# Patient Record
Sex: FEMALE | Race: WHITE | Employment: OTHER | URBAN - METROPOLITAN AREA
[De-identification: names, ages, dates, MRNs, and addresses within clinical notes are randomized per-mention and may not be internally consistent; named-entity substitution may affect disease eponyms.]

---

## 2017-04-25 PROBLEM — Z86.711 HISTORY OF PULMONARY EMBOLISM: Status: ACTIVE | Noted: 2017-04-25

## 2017-05-13 PROCEDURE — 87186 SC STD MICRODIL/AGAR DIL: CPT | Performed by: INTERNAL MEDICINE

## 2017-05-13 PROCEDURE — 87088 URINE BACTERIA CULTURE: CPT | Performed by: INTERNAL MEDICINE

## 2017-05-13 PROCEDURE — 87086 URINE CULTURE/COLONY COUNT: CPT | Performed by: INTERNAL MEDICINE

## 2017-05-31 PROCEDURE — 87186 SC STD MICRODIL/AGAR DIL: CPT | Performed by: INTERNAL MEDICINE

## 2017-05-31 PROCEDURE — 87086 URINE CULTURE/COLONY COUNT: CPT | Performed by: INTERNAL MEDICINE

## 2017-05-31 PROCEDURE — 87077 CULTURE AEROBIC IDENTIFY: CPT | Performed by: INTERNAL MEDICINE

## 2017-10-30 PROBLEM — K58.0 IRRITABLE BOWEL SYNDROME WITH DIARRHEA: Status: ACTIVE | Noted: 2017-10-30

## 2018-04-23 PROBLEM — J45.40 MODERATE PERSISTENT ASTHMA WITHOUT COMPLICATION: Status: RESOLVED | Noted: 2018-04-23 | Resolved: 2018-04-23

## 2018-04-23 PROBLEM — F32.5 MAJOR DEPRESSIVE DISORDER IN FULL REMISSION (HCC): Status: ACTIVE | Noted: 2018-04-23

## 2018-04-23 PROBLEM — J45.40 MODERATE PERSISTENT ASTHMA WITHOUT COMPLICATION: Status: ACTIVE | Noted: 2018-04-23

## 2018-06-12 PROBLEM — Z01.810 PREOP CARDIOVASCULAR EXAM: Status: ACTIVE | Noted: 2018-06-12

## 2018-06-25 PROBLEM — D68.51 HETEROZYGOUS FACTOR V LEIDEN MUTATION (HCC): Status: ACTIVE | Noted: 2018-06-25

## 2018-08-03 PROBLEM — I34.0 NON-RHEUMATIC MITRAL REGURGITATION: Status: ACTIVE | Noted: 2018-08-03

## 2018-10-23 PROBLEM — Z01.810 PREOP CARDIOVASCULAR EXAM: Status: RESOLVED | Noted: 2018-06-12 | Resolved: 2018-10-23

## 2020-07-16 PROBLEM — I77.819 ECTATIC AORTA (HCC): Status: ACTIVE | Noted: 2020-07-16

## 2021-10-25 PROBLEM — I50.9 ACC/AHA STAGE B CONGESTIVE HEART FAILURE (HCC): Status: ACTIVE | Noted: 2021-10-25

## 2021-10-27 ENCOUNTER — APPOINTMENT (OUTPATIENT)
Dept: GENERAL RADIOLOGY | Facility: HOSPITAL | Age: 81
End: 2021-10-27
Attending: INTERNAL MEDICINE
Payer: MEDICARE

## 2021-10-27 ENCOUNTER — HOSPITAL ENCOUNTER (OUTPATIENT)
Facility: HOSPITAL | Age: 81
Setting detail: OBSERVATION
Discharge: HOME | End: 2021-10-29
Attending: EMERGENCY MEDICINE | Admitting: INTERNAL MEDICINE
Payer: MEDICARE

## 2021-10-27 ENCOUNTER — ANESTHESIA (OUTPATIENT)
Dept: ENDOSCOPY | Facility: HOSPITAL | Age: 81
End: 2021-10-27
Payer: MEDICARE

## 2021-10-27 ENCOUNTER — ANESTHESIA EVENT (OUTPATIENT)
Dept: ENDOSCOPY | Facility: HOSPITAL | Age: 81
End: 2021-10-27
Payer: MEDICARE

## 2021-10-27 ENCOUNTER — ANESTHESIA EVENT (OUTPATIENT)
Dept: SURGERY | Facility: HOSPITAL | Age: 81
End: 2021-10-27
Payer: MEDICARE

## 2021-10-27 DIAGNOSIS — K80.50 CHOLEDOCHOLITHIASIS: Primary | ICD-10-CM

## 2021-10-27 DIAGNOSIS — K80.20 CHOLELITHIASIS: ICD-10-CM

## 2021-10-27 DIAGNOSIS — R79.89 AZOTEMIA: ICD-10-CM

## 2021-10-27 PROCEDURE — 83605 ASSAY OF LACTIC ACID: CPT | Performed by: EMERGENCY MEDICINE

## 2021-10-27 PROCEDURE — 0DJ08ZZ INSPECTION OF UPPER INTESTINAL TRACT, VIA NATURAL OR ARTIFICIAL OPENING ENDOSCOPIC: ICD-10-PCS | Performed by: INTERNAL MEDICINE

## 2021-10-27 PROCEDURE — 36415 COLL VENOUS BLD VENIPUNCTURE: CPT

## 2021-10-27 PROCEDURE — 85025 COMPLETE CBC W/AUTO DIFF WBC: CPT | Performed by: EMERGENCY MEDICINE

## 2021-10-27 PROCEDURE — 83690 ASSAY OF LIPASE: CPT | Performed by: EMERGENCY MEDICINE

## 2021-10-27 PROCEDURE — 99285 EMERGENCY DEPT VISIT HI MDM: CPT

## 2021-10-27 PROCEDURE — 0FC98ZZ EXTIRPATION OF MATTER FROM COMMON BILE DUCT, VIA NATURAL OR ARTIFICIAL OPENING ENDOSCOPIC: ICD-10-PCS | Performed by: INTERNAL MEDICINE

## 2021-10-27 PROCEDURE — BD47ZZZ ULTRASONOGRAPHY OF GASTROINTESTINAL TRACT: ICD-10-PCS | Performed by: INTERNAL MEDICINE

## 2021-10-27 PROCEDURE — 80053 COMPREHEN METABOLIC PANEL: CPT | Performed by: EMERGENCY MEDICINE

## 2021-10-27 PROCEDURE — 96361 HYDRATE IV INFUSION ADD-ON: CPT

## 2021-10-27 PROCEDURE — 96375 TX/PRO/DX INJ NEW DRUG ADDON: CPT

## 2021-10-27 PROCEDURE — 87040 BLOOD CULTURE FOR BACTERIA: CPT | Performed by: EMERGENCY MEDICINE

## 2021-10-27 PROCEDURE — 96365 THER/PROPH/DIAG IV INF INIT: CPT

## 2021-10-27 PROCEDURE — 74328 X-RAY BILE DUCT ENDOSCOPY: CPT | Performed by: INTERNAL MEDICINE

## 2021-10-27 RX ORDER — SODIUM CHLORIDE, SODIUM LACTATE, POTASSIUM CHLORIDE, CALCIUM CHLORIDE 600; 310; 30; 20 MG/100ML; MG/100ML; MG/100ML; MG/100ML
INJECTION, SOLUTION INTRAVENOUS CONTINUOUS
Status: DISCONTINUED | OUTPATIENT
Start: 2021-10-27 | End: 2021-10-27 | Stop reason: HOSPADM

## 2021-10-27 RX ORDER — SODIUM CHLORIDE, SODIUM LACTATE, POTASSIUM CHLORIDE, CALCIUM CHLORIDE 600; 310; 30; 20 MG/100ML; MG/100ML; MG/100ML; MG/100ML
INJECTION, SOLUTION INTRAVENOUS CONTINUOUS PRN
Status: DISCONTINUED | OUTPATIENT
Start: 2021-10-27 | End: 2021-10-27 | Stop reason: SURG

## 2021-10-27 RX ORDER — CHOLECALCIFEROL (VITAMIN D3) 125 MCG
500 CAPSULE ORAL DAILY
Status: DISCONTINUED | OUTPATIENT
Start: 2021-10-28 | End: 2021-10-29

## 2021-10-27 RX ORDER — METOCLOPRAMIDE HYDROCHLORIDE 5 MG/ML
10 INJECTION INTRAMUSCULAR; INTRAVENOUS EVERY 8 HOURS PRN
Status: DISCONTINUED | OUTPATIENT
Start: 2021-10-27 | End: 2021-10-29

## 2021-10-27 RX ORDER — ONDANSETRON 2 MG/ML
4 INJECTION INTRAMUSCULAR; INTRAVENOUS ONCE
Status: COMPLETED | OUTPATIENT
Start: 2021-10-27 | End: 2021-10-27

## 2021-10-27 RX ORDER — MORPHINE SULFATE 4 MG/ML
4 INJECTION, SOLUTION INTRAMUSCULAR; INTRAVENOUS EVERY 2 HOUR PRN
Status: DISCONTINUED | OUTPATIENT
Start: 2021-10-27 | End: 2021-10-28

## 2021-10-27 RX ORDER — ONDANSETRON 2 MG/ML
4 INJECTION INTRAMUSCULAR; INTRAVENOUS EVERY 6 HOURS PRN
Status: DISCONTINUED | OUTPATIENT
Start: 2021-10-27 | End: 2021-10-28

## 2021-10-27 RX ORDER — CHOLECALCIFEROL (VITAMIN D3) 125 MCG
2000 CAPSULE ORAL DAILY
Status: DISCONTINUED | OUTPATIENT
Start: 2021-10-28 | End: 2021-10-29

## 2021-10-27 RX ORDER — MORPHINE SULFATE 4 MG/ML
2 INJECTION, SOLUTION INTRAMUSCULAR; INTRAVENOUS EVERY 2 HOUR PRN
Status: DISCONTINUED | OUTPATIENT
Start: 2021-10-27 | End: 2021-10-28

## 2021-10-27 RX ORDER — HYDROMORPHONE HYDROCHLORIDE 1 MG/ML
0.4 INJECTION, SOLUTION INTRAMUSCULAR; INTRAVENOUS; SUBCUTANEOUS EVERY 5 MIN PRN
Status: DISCONTINUED | OUTPATIENT
Start: 2021-10-27 | End: 2021-10-27 | Stop reason: HOSPADM

## 2021-10-27 RX ORDER — DILTIAZEM HYDROCHLORIDE 120 MG/1
240 CAPSULE, EXTENDED RELEASE ORAL 2 TIMES DAILY
Status: DISCONTINUED | OUTPATIENT
Start: 2021-10-27 | End: 2021-10-29

## 2021-10-27 RX ORDER — IPRATROPIUM BROMIDE AND ALBUTEROL SULFATE 2.5; .5 MG/3ML; MG/3ML
3 SOLUTION RESPIRATORY (INHALATION) EVERY 6 HOURS PRN
Status: DISCONTINUED | OUTPATIENT
Start: 2021-10-27 | End: 2021-10-28

## 2021-10-27 RX ORDER — ONDANSETRON 2 MG/ML
4 INJECTION INTRAMUSCULAR; INTRAVENOUS AS NEEDED
Status: DISCONTINUED | OUTPATIENT
Start: 2021-10-27 | End: 2021-10-27 | Stop reason: HOSPADM

## 2021-10-27 RX ORDER — HEPARIN SODIUM 5000 [USP'U]/ML
5000 INJECTION, SOLUTION INTRAVENOUS; SUBCUTANEOUS EVERY 8 HOURS SCHEDULED
Status: DISCONTINUED | OUTPATIENT
Start: 2021-10-27 | End: 2021-10-28

## 2021-10-27 RX ORDER — SODIUM CHLORIDE 9 MG/ML
INJECTION, SOLUTION INTRAVENOUS CONTINUOUS
Status: CANCELLED | OUTPATIENT
Start: 2021-10-27 | End: 2021-10-27

## 2021-10-27 RX ORDER — FENOFIBRATE 134 MG/1
134 CAPSULE ORAL NIGHTLY
Status: DISCONTINUED | OUTPATIENT
Start: 2021-10-27 | End: 2021-10-29

## 2021-10-27 RX ORDER — MORPHINE SULFATE 4 MG/ML
1 INJECTION, SOLUTION INTRAMUSCULAR; INTRAVENOUS EVERY 2 HOUR PRN
Status: DISCONTINUED | OUTPATIENT
Start: 2021-10-27 | End: 2021-10-28

## 2021-10-27 RX ORDER — ONDANSETRON 2 MG/ML
INJECTION INTRAMUSCULAR; INTRAVENOUS AS NEEDED
Status: DISCONTINUED | OUTPATIENT
Start: 2021-10-27 | End: 2021-10-27 | Stop reason: SURG

## 2021-10-27 RX ORDER — MAGNESIUM OXIDE 400 MG (241.3 MG MAGNESIUM) TABLET
1 TABLET NIGHTLY
Status: DISCONTINUED | OUTPATIENT
Start: 2021-10-27 | End: 2021-10-29

## 2021-10-27 RX ORDER — NALOXONE HYDROCHLORIDE 0.4 MG/ML
80 INJECTION, SOLUTION INTRAMUSCULAR; INTRAVENOUS; SUBCUTANEOUS AS NEEDED
Status: DISCONTINUED | OUTPATIENT
Start: 2021-10-27 | End: 2021-10-27 | Stop reason: HOSPADM

## 2021-10-27 RX ORDER — PANTOPRAZOLE SODIUM 20 MG/1
20 TABLET, DELAYED RELEASE ORAL
Refills: 1 | Status: DISCONTINUED | OUTPATIENT
Start: 2021-10-28 | End: 2021-10-27

## 2021-10-27 RX ORDER — PANTOPRAZOLE SODIUM 20 MG/1
20 TABLET, DELAYED RELEASE ORAL NIGHTLY
Status: DISCONTINUED | OUTPATIENT
Start: 2021-10-27 | End: 2021-10-29

## 2021-10-27 RX ORDER — HYDROMORPHONE HYDROCHLORIDE 1 MG/ML
0.5 INJECTION, SOLUTION INTRAMUSCULAR; INTRAVENOUS; SUBCUTANEOUS EVERY 30 MIN PRN
Status: DISCONTINUED | OUTPATIENT
Start: 2021-10-27 | End: 2021-10-27

## 2021-10-27 RX ORDER — SUCRALFATE 1 G/1
1 TABLET ORAL
Status: DISCONTINUED | OUTPATIENT
Start: 2021-10-27 | End: 2021-10-29

## 2021-10-27 RX ORDER — HYDROMORPHONE HYDROCHLORIDE 1 MG/ML
0.5 INJECTION, SOLUTION INTRAMUSCULAR; INTRAVENOUS; SUBCUTANEOUS EVERY 30 MIN PRN
Status: DISCONTINUED | OUTPATIENT
Start: 2021-10-27 | End: 2021-10-28

## 2021-10-27 RX ORDER — TRAZODONE HYDROCHLORIDE 50 MG/1
50 TABLET ORAL NIGHTLY
Status: DISCONTINUED | OUTPATIENT
Start: 2021-10-27 | End: 2021-10-29

## 2021-10-27 RX ORDER — SODIUM CHLORIDE 9 MG/ML
INJECTION, SOLUTION INTRAVENOUS CONTINUOUS
Status: DISCONTINUED | OUTPATIENT
Start: 2021-10-27 | End: 2021-10-29

## 2021-10-27 RX ORDER — DEXAMETHASONE SODIUM PHOSPHATE 4 MG/ML
VIAL (ML) INJECTION AS NEEDED
Status: DISCONTINUED | OUTPATIENT
Start: 2021-10-27 | End: 2021-10-27 | Stop reason: SURG

## 2021-10-27 RX ORDER — LABETALOL HYDROCHLORIDE 5 MG/ML
5 INJECTION, SOLUTION INTRAVENOUS EVERY 5 MIN PRN
Status: DISCONTINUED | OUTPATIENT
Start: 2021-10-27 | End: 2021-10-27 | Stop reason: HOSPADM

## 2021-10-27 RX ORDER — HYDROCODONE BITARTRATE AND ACETAMINOPHEN 5; 325 MG/1; MG/1
1 TABLET ORAL EVERY 4 HOURS PRN
Status: DISCONTINUED | OUTPATIENT
Start: 2021-10-27 | End: 2021-10-28

## 2021-10-27 RX ORDER — HYDRALAZINE HYDROCHLORIDE 20 MG/ML
10 INJECTION INTRAMUSCULAR; INTRAVENOUS EVERY 6 HOURS PRN
Status: DISCONTINUED | OUTPATIENT
Start: 2021-10-27 | End: 2021-10-29

## 2021-10-27 RX ORDER — LIDOCAINE HYDROCHLORIDE 10 MG/ML
INJECTION, SOLUTION EPIDURAL; INFILTRATION; INTRACAUDAL; PERINEURAL AS NEEDED
Status: DISCONTINUED | OUTPATIENT
Start: 2021-10-27 | End: 2021-10-27 | Stop reason: SURG

## 2021-10-27 RX ORDER — HYDROMORPHONE HYDROCHLORIDE 1 MG/ML
0.5 INJECTION, SOLUTION INTRAMUSCULAR; INTRAVENOUS; SUBCUTANEOUS EVERY 30 MIN PRN
Status: CANCELLED | OUTPATIENT
Start: 2021-10-27 | End: 2021-10-27

## 2021-10-27 RX ORDER — HYDROMORPHONE HYDROCHLORIDE 1 MG/ML
INJECTION, SOLUTION INTRAMUSCULAR; INTRAVENOUS; SUBCUTANEOUS
Status: COMPLETED
Start: 2021-10-27 | End: 2021-10-27

## 2021-10-27 RX ORDER — ROCURONIUM BROMIDE 10 MG/ML
INJECTION, SOLUTION INTRAVENOUS AS NEEDED
Status: DISCONTINUED | OUTPATIENT
Start: 2021-10-27 | End: 2021-10-27 | Stop reason: SURG

## 2021-10-27 RX ORDER — ACETAMINOPHEN 325 MG/1
650 TABLET ORAL EVERY 4 HOURS PRN
Status: DISCONTINUED | OUTPATIENT
Start: 2021-10-27 | End: 2021-10-29

## 2021-10-27 RX ORDER — ONDANSETRON 2 MG/ML
4 INJECTION INTRAMUSCULAR; INTRAVENOUS EVERY 4 HOURS PRN
Status: CANCELLED | OUTPATIENT
Start: 2021-10-27 | End: 2021-10-27

## 2021-10-27 RX ORDER — DULOXETIN HYDROCHLORIDE 30 MG/1
30 CAPSULE, DELAYED RELEASE ORAL DAILY
Status: DISCONTINUED | OUTPATIENT
Start: 2021-10-28 | End: 2021-10-29

## 2021-10-27 RX ORDER — FENOFIBRATE 134 MG/1
134 CAPSULE ORAL DAILY
Status: DISCONTINUED | OUTPATIENT
Start: 2021-10-28 | End: 2021-10-27

## 2021-10-27 RX ORDER — LISINOPRIL 10 MG/1
10 TABLET ORAL DAILY
Status: DISCONTINUED | OUTPATIENT
Start: 2021-10-28 | End: 2021-10-29

## 2021-10-27 RX ORDER — HYDROCODONE BITARTRATE AND ACETAMINOPHEN 5; 325 MG/1; MG/1
2 TABLET ORAL EVERY 4 HOURS PRN
Status: DISCONTINUED | OUTPATIENT
Start: 2021-10-27 | End: 2021-10-28

## 2021-10-27 RX ADMIN — DEXAMETHASONE SODIUM PHOSPHATE 8 MG: 4 MG/ML VIAL (ML) INJECTION at 15:26:00

## 2021-10-27 RX ADMIN — ONDANSETRON 4 MG: 2 INJECTION INTRAMUSCULAR; INTRAVENOUS at 15:42:00

## 2021-10-27 RX ADMIN — SODIUM CHLORIDE, SODIUM LACTATE, POTASSIUM CHLORIDE, CALCIUM CHLORIDE: 600; 310; 30; 20 INJECTION, SOLUTION INTRAVENOUS at 15:13:00

## 2021-10-27 RX ADMIN — ROCURONIUM BROMIDE 10 MG: 10 INJECTION, SOLUTION INTRAVENOUS at 15:28:00

## 2021-10-27 RX ADMIN — LIDOCAINE HYDROCHLORIDE 30 MG: 10 INJECTION, SOLUTION EPIDURAL; INFILTRATION; INTRACAUDAL; PERINEURAL at 15:18:00

## 2021-10-27 NOTE — ED QUICK NOTES
Patient states abd pain since Sunday. Last BM Sunday. Fells bloated and was vomiting over weekend.  No longer vomiting

## 2021-10-27 NOTE — ED PROVIDER NOTES
Patient Seen in: [facility] Emergency Department      History   Patient presents with:  Abdomen/Flank Pain    Stated Complaint: abd pain    Subjective:   HPI    21-year-old female presents to the emergency department for evaluation and hospitalizati 09/16/2015    Diverticulosis–sigmoid, melanosis coli, tortuous colon   • CYSTOSCOPY,MANIPULATN  03/01/2008    Cystocopy    • EGD  05/03/2007    GERD with esophagitis   • EGD  08/10/2011    Aborted   • EGD  09/16/2015    GERD with esophagitis; hiatal and pa exam: Awake and moving all 4 extremities well.     ED Course     Labs Reviewed   COMP METABOLIC PANEL (14) - Abnormal; Notable for the following components:       Result Value    Glucose 100 (*)     Sodium 135 (*)     BUN 21 (*)     GFR, Non- Citlaly to choledocholithiasis. Analgesics and Unasyn provided. 2.  Azotemia.   Admission disposition: 10/27/2021 12:25 PM                                  Disposition and Plan     Clinical Impression:  Choledocholithiasis  (primary encounter diagnosis)  Azotemia

## 2021-10-27 NOTE — ANESTHESIA PROCEDURE NOTES
Airway  Date/Time: 10/27/2021 3:18 PM  Urgency: elective    Airway not difficult    General Information and Staff    Patient location during procedure: OR  Anesthesiologist: Sharlene Michaels MD  Performed: anesthesiologist     Indications and Patient Condi

## 2021-10-27 NOTE — ANESTHESIA PREPROCEDURE EVALUATION
PRE-OP EVALUATION    Patient Name: Barabara Boast    Admit Diagnosis: No admission diagnoses are documented for this encounter.     Pre-op Diagnosis: add on    ENDOSCOPIC ULTRASOUND (EUS)AND ENDOSCOPIC RETROGRADE CHOLANGIOPANCREATOGRAPHY    Anesthesia Pro Neuro/Psych                              Patient Active Problem List:     Hypertension, essential     Dyslipidemia     Chronic obstructive pulmonary disease ([address]     History of pulmonary embolism     Irritable bowel syndrome with diarrhea     Major depres RBC 5.01 10/25/2021    HGB 16.2 (H) 10/27/2021    HGB 15.6 10/25/2021    HCT 49.1 (H) 10/27/2021    HCT 47.0 10/25/2021    MCV 94.1 10/27/2021    MCV 93.8 10/25/2021    MCH 31.0 10/27/2021    MCH 31.1 10/25/2021    MCHC 33.0 10/27/2021    MCHC 33.2 10/25/2

## 2021-10-27 NOTE — PROGRESS NOTES
Talked to Dr Jacquie Caballero - plan for lap josie possible open tomorrow afternoon. NPO midnight. Hold morning heparin.

## 2021-10-27 NOTE — OPERATIVE REPORT
[address] [facility] REPORT   PATIENT NAME: Stoero Cullen  MRN: [id number]  DATE OF OPERATION: 10/27/2021  PREOPERATIVE DIAGNOSIS: Obstructive jaundice, epigastric abdominal pain, elevated liver enzymes, dilated common bile duct, cholelithiasis  POS pancreatitis, neoplasms, or cysts. Pancreatic duct was normal in caliber throughout. Pancreatic duct was seen to be normal in the neck and was seen going toward the head of pancreas. The pancreatic tail was seen next to the left kidney and spleen.   The contrast was seen within the cystic duct, gallbladder, pancreatic duct. 2 tablets of indomethacin were given per rectum to prevent pancreatitis. Scope was withdrawn from the patient and patient tolerated the procedure well. FINDINGS   1.  Choledocholit

## 2021-10-27 NOTE — PROGRESS NOTES
Patient admitted via cart from PACU, transferred to bed. Oriented to room. Safety precautions initiated. Call light in reach. Denies pain or nausea. Started on clear liquids. General surgery paged regarding new consult.  Blood pressure improved from PACU

## 2021-10-27 NOTE — ANESTHESIA POSTPROCEDURE EVALUATION
[address] [facility] Patient Status:  Emergency   Age/Gender 79year old female MRN [id number]   Location [address] [facility] Attending No att. providers found   [facility] Day # 0 PCP Sugey Bucio MD       Anesthesia Post

## 2021-10-28 ENCOUNTER — ANESTHESIA (OUTPATIENT)
Dept: SURGERY | Facility: HOSPITAL | Age: 81
End: 2021-10-28
Payer: MEDICARE

## 2021-10-28 PROCEDURE — 88304 TISSUE EXAM BY PATHOLOGIST: CPT | Performed by: SURGERY

## 2021-10-28 PROCEDURE — 85025 COMPLETE CBC W/AUTO DIFF WBC: CPT | Performed by: INTERNAL MEDICINE

## 2021-10-28 PROCEDURE — 80053 COMPREHEN METABOLIC PANEL: CPT | Performed by: INTERNAL MEDICINE

## 2021-10-28 PROCEDURE — 0FT44ZZ RESECTION OF GALLBLADDER, PERCUTANEOUS ENDOSCOPIC APPROACH: ICD-10-PCS | Performed by: SURGERY

## 2021-10-28 PROCEDURE — 94640 AIRWAY INHALATION TREATMENT: CPT

## 2021-10-28 RX ORDER — HYDROMORPHONE HYDROCHLORIDE 1 MG/ML
0.2 INJECTION, SOLUTION INTRAMUSCULAR; INTRAVENOUS; SUBCUTANEOUS EVERY 2 HOUR PRN
Status: DISCONTINUED | OUTPATIENT
Start: 2021-10-28 | End: 2021-10-29

## 2021-10-28 RX ORDER — ONDANSETRON 2 MG/ML
4 INJECTION INTRAMUSCULAR; INTRAVENOUS AS NEEDED
Status: DISCONTINUED | OUTPATIENT
Start: 2021-10-28 | End: 2021-10-28 | Stop reason: HOSPADM

## 2021-10-28 RX ORDER — HYDROCODONE BITARTRATE AND ACETAMINOPHEN 5; 325 MG/1; MG/1
1 TABLET ORAL EVERY 4 HOURS PRN
Status: DISCONTINUED | OUTPATIENT
Start: 2021-10-28 | End: 2021-10-29

## 2021-10-28 RX ORDER — BUPIVACAINE HYDROCHLORIDE 5 MG/ML
INJECTION, SOLUTION EPIDURAL; INTRACAUDAL AS NEEDED
Status: DISCONTINUED | OUTPATIENT
Start: 2021-10-28 | End: 2021-10-28 | Stop reason: HOSPADM

## 2021-10-28 RX ORDER — HEPARIN SODIUM 5000 [USP'U]/ML
5000 INJECTION, SOLUTION INTRAVENOUS; SUBCUTANEOUS EVERY 12 HOURS SCHEDULED
Status: DISCONTINUED | OUTPATIENT
Start: 2021-10-29 | End: 2021-10-29

## 2021-10-28 RX ORDER — DIPHENHYDRAMINE HYDROCHLORIDE 50 MG/ML
12.5 INJECTION INTRAMUSCULAR; INTRAVENOUS AS NEEDED
Status: DISCONTINUED | OUTPATIENT
Start: 2021-10-28 | End: 2021-10-28 | Stop reason: HOSPADM

## 2021-10-28 RX ORDER — HYDROCODONE BITARTRATE AND ACETAMINOPHEN 5; 325 MG/1; MG/1
2 TABLET ORAL EVERY 4 HOURS PRN
Status: DISCONTINUED | OUTPATIENT
Start: 2021-10-28 | End: 2021-10-29

## 2021-10-28 RX ORDER — ONDANSETRON 2 MG/ML
INJECTION INTRAMUSCULAR; INTRAVENOUS AS NEEDED
Status: DISCONTINUED | OUTPATIENT
Start: 2021-10-28 | End: 2021-10-28 | Stop reason: SURG

## 2021-10-28 RX ORDER — HYDROCODONE BITARTRATE AND ACETAMINOPHEN 5; 325 MG/1; MG/1
2 TABLET ORAL AS NEEDED
Status: DISCONTINUED | OUTPATIENT
Start: 2021-10-28 | End: 2021-10-28 | Stop reason: HOSPADM

## 2021-10-28 RX ORDER — DEXAMETHASONE SODIUM PHOSPHATE 4 MG/ML
VIAL (ML) INJECTION AS NEEDED
Status: DISCONTINUED | OUTPATIENT
Start: 2021-10-28 | End: 2021-10-28 | Stop reason: SURG

## 2021-10-28 RX ORDER — MIDAZOLAM HYDROCHLORIDE 1 MG/ML
1 INJECTION INTRAMUSCULAR; INTRAVENOUS EVERY 5 MIN PRN
Status: DISCONTINUED | OUTPATIENT
Start: 2021-10-28 | End: 2021-10-28 | Stop reason: HOSPADM

## 2021-10-28 RX ORDER — ROCURONIUM BROMIDE 10 MG/ML
INJECTION, SOLUTION INTRAVENOUS AS NEEDED
Status: DISCONTINUED | OUTPATIENT
Start: 2021-10-28 | End: 2021-10-28 | Stop reason: SURG

## 2021-10-28 RX ORDER — IPRATROPIUM BROMIDE AND ALBUTEROL SULFATE 2.5; .5 MG/3ML; MG/3ML
3 SOLUTION RESPIRATORY (INHALATION)
Status: DISCONTINUED | OUTPATIENT
Start: 2021-10-28 | End: 2021-10-29

## 2021-10-28 RX ORDER — HYDROCODONE BITARTRATE AND ACETAMINOPHEN 5; 325 MG/1; MG/1
1 TABLET ORAL AS NEEDED
Status: DISCONTINUED | OUTPATIENT
Start: 2021-10-28 | End: 2021-10-28 | Stop reason: HOSPADM

## 2021-10-28 RX ORDER — ONDANSETRON 2 MG/ML
4 INJECTION INTRAMUSCULAR; INTRAVENOUS EVERY 6 HOURS PRN
Status: DISCONTINUED | OUTPATIENT
Start: 2021-10-28 | End: 2021-10-29

## 2021-10-28 RX ORDER — LABETALOL HYDROCHLORIDE 5 MG/ML
INJECTION, SOLUTION INTRAVENOUS AS NEEDED
Status: DISCONTINUED | OUTPATIENT
Start: 2021-10-28 | End: 2021-10-28 | Stop reason: SURG

## 2021-10-28 RX ORDER — MEPERIDINE HYDROCHLORIDE 25 MG/ML
12.5 INJECTION INTRAMUSCULAR; INTRAVENOUS; SUBCUTANEOUS AS NEEDED
Status: DISCONTINUED | OUTPATIENT
Start: 2021-10-28 | End: 2021-10-28 | Stop reason: HOSPADM

## 2021-10-28 RX ORDER — HYDROMORPHONE HYDROCHLORIDE 1 MG/ML
0.4 INJECTION, SOLUTION INTRAMUSCULAR; INTRAVENOUS; SUBCUTANEOUS EVERY 5 MIN PRN
Status: DISCONTINUED | OUTPATIENT
Start: 2021-10-28 | End: 2021-10-28 | Stop reason: HOSPADM

## 2021-10-28 RX ORDER — NALOXONE HYDROCHLORIDE 0.4 MG/ML
80 INJECTION, SOLUTION INTRAMUSCULAR; INTRAVENOUS; SUBCUTANEOUS AS NEEDED
Status: DISCONTINUED | OUTPATIENT
Start: 2021-10-28 | End: 2021-10-28 | Stop reason: HOSPADM

## 2021-10-28 RX ORDER — HYDROMORPHONE HYDROCHLORIDE 1 MG/ML
0.4 INJECTION, SOLUTION INTRAMUSCULAR; INTRAVENOUS; SUBCUTANEOUS EVERY 2 HOUR PRN
Status: DISCONTINUED | OUTPATIENT
Start: 2021-10-28 | End: 2021-10-29

## 2021-10-28 RX ORDER — SODIUM CHLORIDE, SODIUM LACTATE, POTASSIUM CHLORIDE, CALCIUM CHLORIDE 600; 310; 30; 20 MG/100ML; MG/100ML; MG/100ML; MG/100ML
INJECTION, SOLUTION INTRAVENOUS CONTINUOUS
Status: DISCONTINUED | OUTPATIENT
Start: 2021-10-28 | End: 2021-10-28 | Stop reason: HOSPADM

## 2021-10-28 RX ORDER — POLYETHYLENE GLYCOL 3350 17 G/17G
17 POWDER, FOR SOLUTION ORAL DAILY PRN
Status: DISCONTINUED | OUTPATIENT
Start: 2021-10-28 | End: 2021-10-29

## 2021-10-28 RX ADMIN — DEXAMETHASONE SODIUM PHOSPHATE 4 MG: 4 MG/ML VIAL (ML) INJECTION at 14:31:00

## 2021-10-28 RX ADMIN — ONDANSETRON 4 MG: 2 INJECTION INTRAMUSCULAR; INTRAVENOUS at 14:58:00

## 2021-10-28 RX ADMIN — SODIUM CHLORIDE: 9 INJECTION, SOLUTION INTRAVENOUS at 15:19:00

## 2021-10-28 RX ADMIN — ROCURONIUM BROMIDE 50 MG: 10 INJECTION, SOLUTION INTRAVENOUS at 14:18:00

## 2021-10-28 RX ADMIN — LABETALOL HYDROCHLORIDE 5 MG: 5 INJECTION, SOLUTION INTRAVENOUS at 14:35:00

## 2021-10-28 NOTE — PROGRESS NOTES
[facility] Hospitalist Progress Note     Néstor Raza Patient Status:  Observation    1940 MRN [id number]   Location [facility] Attending Alhaji Bhatti,    Hosp Day # 0 PCP Connor Cancino Results    COVID-19  Lab Results   Component Value Date    COVID19 Not Detected 10/27/2021       Pro-Calcitonin  No results for input(s): PCT in the last 168 hours. Cardiac  No results for input(s): TROP, PBNP in the last 168 hours.     Creatinine Kinase ASA when cleared by GI and gen surg     # Factor V Leiden mutation   - hx of PE in 2011  - was on xarelto until Dec 2020  - follows with Dr. Yenny Grant of oncology   - monitored off anticoagulation     Plan of care:  Post op care per gen surgery recs.       Claudio

## 2021-10-28 NOTE — CONSULTS
[facility]    Report of Gastroenterology Consultation    Elizabeth Elizabeth Patient Status:  Observation    1940 MRN [id number]   [facility] 3SW-A Attending Ruy Faye, DO   Hosp Day # 0 PCP Austyn Clifford MD     Date of hemorrhoids   • COLONOSCOPY  08/10/2011    Tortuous colon, aborted at descending   • COLONOSCOPY  05/23/2012    Tubular adenoma, descending; diverticulosis–sigmoid   • COLONOSCOPY  09/16/2015    Diverticulosis–sigmoid, melanosis coli, tortuous colon   • CY Facility-Administered Medications:   •  HYDROmorphone HCl (DILAUDID) 1 MG/ML injection 0.5 mg, 0.5 mg, Intravenous, Q30 Min PRN  •  0.9% NaCl infusion, , Intravenous, Continuous  •  Heparin Sodium (Porcine) 5000 UNIT/ML injection 5,000 Units, 5,000 Units, Systems:  Gastrointestinal: Denies positive test for blood stool, heartburn/indigestion/reflux, belching, difficulty swallowing, irregular bowel habits, painful swallowing, diarrhea, abdominal pain, constipation, nausea, incontinence of stool, vomiting, bl excessive bleeding, enlarging or painful lymph nodes. Allergy: Denies medication allergy, latex/rubber allergy, anaphylactic or other reaction to anesthesia, food allergy. Eyes: Denies blurred/double vision, eye disease, glasses or contacts, glaucoma. regurgitation     Carpal tunnel syndrome     History of bladder infections     Osteoarthritis     Hx of colonic polyps     GERD (gastroesophageal reflux disease)     Diverticulosis of colon     Paraesophageal hernia     Ectatic aorta (HCC)     ACC/AHA stag

## 2021-10-28 NOTE — PLAN OF CARE
Patient resting comfortable in bed. Denies pain or nausea. Aware of plan for surgery later this afternoon. IV changed to left wrist from right AC per patient request.  Discussed need to CDB, use IS and do ankle pump exercises after surgery.  Patient able

## 2021-10-28 NOTE — ANESTHESIA PREPROCEDURE EVALUATION
PRE-OP EVALUATION    Patient Name: Yanet Rehman    Admit Diagnosis: Choledocholithiasis [K80.50]  Azotemia [R79.89]    Pre-op Diagnosis: Cholelithiasis [K80.20]    LAPAROSCOPIC CHOLECYSTECTOMY POSSIBLE OPEN    Anesthesia Procedure: Lidya Blackmon (INDOCIN) 50 MG suppository, , ,   Vitamin D3 (Cholecalciferol) tab 2,000 Units, 2,000 Units, Oral, Daily  dilTIAZem (DILACOR XR) 24 hr cap 240 mg, 240 mg, Oral, BID  DULoxetine (CYMBALTA) DR particles cap 30 mg, 30 mg, Oral, Daily  lisinopril tab 10 mg, 1 time  Estradiol (ESTRING) 2 MG Vaginal Ring, Place 1 mg vaginally every 21 days. , Disp: , Rfl: , Past Week at Unknown time  melatonin 1 MG Oral Tab, Take 1 mg by mouth nightly., Disp: , Rfl: , Past Week at Unknown time  Cholecalciferol (VITAMIN D) 50 MCG ( Irritable bowel syndrome with diarrhea     Major depressive disorder in full remission ([facility] [address]     Heterozygous factor V Leiden mutation ([facility] [address]     Non-rheumatic mitral regurgitation     Carpal tunnel syndrome     History of bladder infections     Osteoarthr 10/25/2021    MCHC 33.5 10/28/2021    MCHC 33.2 10/25/2021    RDW 13.3 10/28/2021    RDW 13.2 10/25/2021    .0 10/28/2021     10/25/2021     Lab Results   Component Value Date     10/28/2021     10/25/2021    K 3.4 (L) 10/28/2021

## 2021-10-28 NOTE — PLAN OF CARE
Pt is alert and oriented on room air. . Bilateral SCD. Voiding freely. Last bowel movement on 10/24. Pt denies pain. Denies n/v. NPO after midnight. Plan: Slava galindo Thursday afternoon.

## 2021-10-28 NOTE — ANESTHESIA PROCEDURE NOTES
Airway  Date/Time: 10/28/2021 2:20 PM  Urgency: elective      General Information and Staff    Patient location during procedure: OR  Anesthesiologist: Lexii Thurston MD  Performed: anesthesiologist     Indications and Patient Condition  Indications for air

## 2021-10-28 NOTE — CONSULTS
[facility]  Report of Consultation    Prerna Bae Patient Status:  Observation    1940 MRN [id number]   [facility] [place] 3SW-A Attending Kehinde Fraser,    Hosp Day # 0 PCP Katelynn Eddy MD     10/28/21    Reason for Consul COLONOSCOPY  05/03/2007    Diverticulosis-sigmoid; hemorrhoids    COLONOSCOPY  08/10/2011    Tortuous colon, aborted at descending    COLONOSCOPY  05/23/2012    Tubular adenoma, descending; diverticulosis-sigmoid    COLONOSCOPY  09/16/2015    [address] Allergy  Tizanidine              UNKNOWN    Current Medications:      Current Facility-Administered Medications:     HYDROmorphone HCl (DILAUDID) 1 MG/ML injection 0.5 mg, 0.5 mg, Intravenous, Q30 Min PRN    0.9% NaCl infusion, , Intravenous, Continuous BID    Home Medications:    No current facility-administered medications on file prior to encounter.   sucralfate 1 g Oral Tab, Take 1 tablet (1 g total) by mouth 4 (four) times daily before meals and nightly., Disp: 30 tablet, Rfl: 0  ondansetron (ZOFRAN) Exam:    Blood pressure 135/67, pulse 66, temperature 98 °F (36.7 °C), temperature source Oral, resp. rate 16, SpO2 95 %, not currently breastfeeding.     GENERAL: Alert and oriented x 3, well developed, well nourished female, in no apparent distress    SKI cholecystitis. Important findings were communicated to [facility] by Dr. Patito Mueller at 10/26/2021 3:58 PM.     XR ENDO BILE DUCT ([id number]=[id number])    Result Date: 10/27/2021  PROCEDURE:  XR ENDO BILE DUCT ([id number]=[id number])  COMPARISON:  None.   INDICATIONS:  ERCP Surgery  10/28/2021    Addendum  Agree as above   Will proceed with lap cholecystectomy possible open today  Likely will discharge home tomorrow   Will repeat hepatic panel during the postoperative visit (trending down but not normal)  Voiced understanding

## 2021-10-28 NOTE — ANESTHESIA POSTPROCEDURE EVALUATION
[facility] Patient Status:  Observation   Age/Gender 79year old female MRN [id number]   Location [address] [facility] Attending Asher Knox,    Hosp Day # 0 PCP Rene Garcia MD       Anesthesia Pos

## 2021-10-28 NOTE — BRIEF OP NOTE
Pre-Operative Diagnosis: Cholelithiasis [K80.20]     Post-Operative Diagnosis: Cholelithiasis [K80.20]      Procedure Performed:   LAPAROSCOPIC CHOLECYSTECTOMY     Surgeon(s) and Role:     Evelio Chaney MD - Primary    Assistant(s):  Surgical Assistant

## 2021-10-29 VITALS
TEMPERATURE: 98 F | HEART RATE: 85 BPM | BODY MASS INDEX: 26 KG/M2 | RESPIRATION RATE: 16 BRPM | OXYGEN SATURATION: 97 % | WEIGHT: 132.06 LBS | SYSTOLIC BLOOD PRESSURE: 109 MMHG | DIASTOLIC BLOOD PRESSURE: 64 MMHG

## 2021-10-29 PROCEDURE — 94640 AIRWAY INHALATION TREATMENT: CPT

## 2021-10-29 RX ORDER — AMOXICILLIN AND CLAVULANATE POTASSIUM 875; 125 MG/1; MG/1
1 TABLET, FILM COATED ORAL 2 TIMES DAILY
Qty: 10 TABLET | Refills: 0 | Status: SHIPPED | OUTPATIENT
Start: 2021-10-29 | End: 2021-11-03

## 2021-10-29 RX ORDER — TRAMADOL HYDROCHLORIDE 50 MG/1
TABLET ORAL EVERY 4 HOURS PRN
Qty: 20 TABLET | Refills: 0 | Status: SHIPPED | OUTPATIENT
Start: 2021-10-29

## 2021-10-29 NOTE — PLAN OF CARE
Pt is alert and oriented on room air. . I/S. Bilateral SCD. I/S encourage. Voiding freely. Last bowel movement 10/24. Pt denies n/v. Pt tolerating full liquid diet. Lap site X4 to abdomen is C,D,I. Pt reports mild pain to site.  Pt denies pain medication

## 2021-10-29 NOTE — PLAN OF CARE
Patient up to bathroom with assist. Voided. Tolerating full liquids.   Discussed advance to soft diet in am.

## 2021-10-29 NOTE — DISCHARGE SUMMARY
[facility] Internal Medicine Hospitalist Discharge Summary     Patient ID:  Mata Correa  79year old  5/4/1940    Admit date: 10/27/2021    Discharge date and time: 10/29/2021     Attending Natalee Procedures: Procedure(s) (LRB):  LAPAROSCOPIC CHOLECYSTECTOMY (N/A)       Patient instructions:      I as the attending physician reconciled the current and discharge medications on day of discharge.      Current Discharge Medication List    CONTINUE these distress, alert and oriented x 3  Heart: RRR  Lungs: clear bilaterally, no active wheezing  Abdomen: nontender, nondistended, intact BS. Lab incisions clean with some bruising.   Extremities: no pedal edema   Neuro: CN inact, no focal deficits      Total t

## 2021-10-29 NOTE — PROGRESS NOTES
[facility]  Progress Note    Adal Balling Patient Status:  Observation    1940 MRN [id number]   [facility] 3SW-A Attending Asher Knox, DO   Hosp Day # 0 PCP Rene Garcia MD     Subjective:    Patient tolerating PO d

## 2021-10-29 NOTE — PROGRESS NOTES
[facility]    Progress Note    Barrie Chicas Patient Status:  Observation    1940 MRN [id number]   [facility] 3SW-A Attending Ronda Delarosa,    Hosp Day # 0 PCP Octaviano Sandra MD     Subjective:  Barrie Chicas is a mitral regurgitation     Carpal tunnel syndrome     History of bladder infections     Osteoarthritis     Hx of colonic polyps     GERD (gastroesophageal reflux disease)     Diverticulosis of colon     Paraesophageal hernia     Ectatic aorta (HCC)     ACC/A no

## 2021-10-29 NOTE — PLAN OF CARE
A&O x4. VSS on 2L O2 per nc. Neb tx scheduled BID. Pain controlled, declines pain medication overnight. Ambulating with SBA. Voids freely. Bilateral SCDs. Lap sites x4 open to air, C/D/I. Bruising to abdomen surrounding lap sites. Bowel sounds active.  Pt p

## 2021-10-29 NOTE — PLAN OF CARE
Received patient from PACU. Rating pain a \"5\". Patient does not want pain medication at this time. Discussed that Norco available if needed. Patient states laying on left side helps decrease pain.   Discussed need to CDB, use IS and do ankle pump exerci

## 2021-11-02 ENCOUNTER — PATIENT OUTREACH (OUTPATIENT)
Dept: CASE MANAGEMENT | Age: 81
End: 2021-11-02

## 2021-11-02 NOTE — PROGRESS NOTES
Patients daughter called requesting assistance             MD Guillermo [address], [address]  [phone]     Apt scheduled for Nov 15th @1:30

## 2021-11-23 NOTE — OPERATIVE REPORT
[address] [place]    PATIENT'S NAME: [place] Rose   ATTENDING PHYSICIAN: Bill Dominguez DO   OPERATING PHYSICIAN: Hebert Bingham M.D.    PATIENT ACCOUNT#:   [de-identified]    LOCATION:  [address] [place] [address]  MEDICAL RECORD #:   [id number]       DATE OF BIRTH:  [birth year].  Abdomen was elevated. Veress needle was inserted. The abdomen was insufflated with CO2. A 5 mm Visiport trocar was inserted under direct guidance of the camera.   Then, an 11 mm port in the subxiphoid and two 5 mm ports in the right upper quadrant

## 2024-12-19 NOTE — H&P
[facility] Hospitalist History and Physical      Patient presents with:  Abdomen/Flank Pain       PCP: Lucy Gonsalez MD      History of Present Illness: Patient is a 79year old female with PMH sig for HTN, COPD, depression, aortic ectasia, an See Note for instructions given to patient.     05/23/2012    Tubular adenoma, descending; diverticulosis–sigmoid   • COLONOSCOPY  09/16/2015    Diverticulosis–sigmoid, melanosis coli, tortuous colon   • CYSTOSCOPY,MANIPULATN  03/01/2008    Cystocopy    • EGD  05/03/2007    GERD with esophagitis   • EGD mouth daily. , Disp: 90 capsule, Rfl: 1  omeprazole 20 MG Oral Capsule Delayed Release, Take 1 capsule (20 mg total) by mouth daily. , Disp: 90 capsule, Rfl: 1  ASPIRIN 81 MG Oral Tab EC, TAKE 1 TABLET BY MOUTH EVERY DAY, Disp: 90 tablet, Rfl: 1  Fluticasone alert and oriented x3, no focal neurologic deficits  HEENT:  EOMI, PERRLA, OP clear, MMM  Pulm: Lungs clear bilaterally, normal respiratory effort  CV: Heart with regular rate and rhythm, no murmur. Normal PMI.     Abd: Abdomen soft, +upper abd tenderness, XR ENDO BILE DUCT (ZGC=[id number])    Result Date: 10/27/2021  PROCEDURE:  XR ENDO BILE DUCT (LRM=[id number])  COMPARISON:  None.   INDICATIONS:  ERCP  FLUOROSCOPY IMAGES OBTAINED:  3 FLUOROSCOPY TIME:  1minute 15 seconds TECHNOLOGIST TIME:  30minutes RADIATION D Hospitalist      **Certification      PHYSICIAN Certification of Need for Inpatient Hospitalization - Initial Certification    Patient will require inpatient services that will reasonably be expected to span two midnight's based on the clinical documentati 5

## (undated) DEVICE — ENDOPATH ULTRA VERESS INSUFFLATION NEEDLES WITH LUER LOCK CONNECTORS: Brand: ENDOPATH

## (undated) DEVICE — SCD SLEEVE KNEE HI BLEND

## (undated) DEVICE — FIRSTSTEP 200ML READY2USE GEMI

## (undated) DEVICE — RETRIEVAL BALLOON CATHETER: Brand: EXTRACTOR™ PRO RX

## (undated) DEVICE — KENDALL SCD EXPRESS SLEEVES, KNEE LENGTH, MEDIUM: Brand: KENDALL SCD

## (undated) DEVICE — STERILE SYNTHETIC POLYISOPRENE POWDER-FREE SURGICAL GLOVES WITH HYDROGEL COATING: Brand: PROTEXIS

## (undated) DEVICE — ANCHOR TISSUE RETRIEVAL SYSTEM, BAG SIZE 175 ML, PORT SIZE 10 MM: Brand: ANCHOR TISSUE RETRIEVAL SYSTEM

## (undated) DEVICE — SYRINGE 10ML SLIP TIP

## (undated) DEVICE — LAP CHOLE/APPY CDS-LF: Brand: MEDLINE INDUSTRIES, INC.

## (undated) DEVICE — SYRINGE IV FLUSH PRE-FILL10M

## (undated) DEVICE — LIGHT HANDLE

## (undated) DEVICE — LIGACLIP EXTRA LIGATING CLIP CARTRIDGES: 6 TITANIUM CLIPS/ CARTRIDGE (LARGE): Brand: LIGACLIP

## (undated) DEVICE — VISUALIZATION SYSTEM: Brand: CLEARIFY

## (undated) DEVICE — ALCOHOL PREP,2 PLY, MEDIUM: Brand: WEBCOL

## (undated) DEVICE — 1200CC GUARDIAN II: Brand: GUARDIAN

## (undated) DEVICE — SOL  .9 1000ML BTL

## (undated) DEVICE — 3M™ RED DOT™ MONITORING ELECTRODE WITH FOAM TAPE AND STICKY GEL, 50/BAG, 20/CASE, 72/PLT 2570: Brand: RED DOT™

## (undated) DEVICE — MEDI-VAC NON-CONDUCTIVE SUCTION TUBING: Brand: CARDINAL HEALTH

## (undated) DEVICE — TROCAR: Brand: KII FIOS FIRST ENTRY

## (undated) DEVICE — BITE BLOCK ADULT 60F ELASTIC

## (undated) DEVICE — DISPOSABLE LAPAROSCOPIC CLIP APPLIER WITH 20 CLIPS.: Brand: EPIX® UNIVERSAL CLIP APPLIER

## (undated) DEVICE — UNDYED BRAIDED (POLYGLACTIN 910), SYNTHETIC ABSORBABLE SUTURE: Brand: COATED VICRYL

## (undated) DEVICE — FILTERLINE NASAL ADULT O2/CO2

## (undated) DEVICE — LOCKING DEVICE RX & BIOPSY CAP

## (undated) DEVICE — SPHINCTEROTOME: Brand: HYDRATOME RX 44

## (undated) DEVICE — REM POLYHESIVE ADULT PATIENT RETURN ELECTRODE: Brand: VALLEYLAB

## (undated) DEVICE — SUTURE VICRYL 0

## (undated) DEVICE — ENDOSCOPY PACK - LOWER: Brand: MEDLINE INDUSTRIES, INC.

## (undated) DEVICE — MEDI-VAC SUCTION HANDLE REGULAR CAPACITY: Brand: CARDINAL HEALTH

## (undated) DEVICE — GLOVE SURG SENSICARE SZ 7

## (undated) DEVICE — TROCAR: Brand: KII® SLEEVE

## (undated) DEVICE — EXOFIN TISSUE ADHESIVE 1.0ML

## (undated) DEVICE — ENDOPATH 5MM CURVED SCISSORS WITH MONOPOLAR CAUTERY: Brand: ENDOPATH

## (undated) DEVICE — 40580 - THE PINK PAD - ADVANCED TRENDELENBURG POSITIONING KIT: Brand: 40580 - THE PINK PAD - ADVANCED TRENDELENBURG POSITIONING KIT

## (undated) NOTE — LETTER
[address] FOR BLOOD OR BLOOD COMPONENTS      In the course of your treatment, it may become necessary to administer a transfusion of blood or blood components.  This form provides basic information concerning this proc alternatives to you if it has not already been done. I,Avril Wright, have read/had read to me the above. I understand the matters bearing on the decision whether or not to authorize a transfusion of blood or blood components.  I have no questions which

## (undated) NOTE — LETTER
Kristin Lindsay [address]  [address] [facility] [address] [facility]  Authorization for Surgical Operation and Procedure     Date:___________                                                                                                         Time:__________ risks that can occur: fever and allergic reactions, hemolytic reactions, transmission of diseases such as Hepatitis, AIDS and Cytomegalovirus (CMV) and fluid overload.   In the event that I wish to have an autologous transfusion of my own blood, or a direct determine when the applicable recovery period ends for purposes of reinstating the DNAR order.   10. Patients having a sterilization procedure: I understand that if the procedure is successful the results will be permanent and it will therefore be impossibl (anesthesia doctor) to give me medicine and do additional procedures as necessary.  Some examples are: Starting or using an “IV” to give me medicine, fluids or blood during my procedure, and having a breathing tube placed to help me breathe when I’m asleep blocks”): I understand that rare but potential complications include headache, bleeding, infection, seizure, irregular heart rhythms, and nerve injury.     I can change my mind about having anesthesia services at any time before I get the medicine.    ____

## (undated) NOTE — LETTER
Kristin Lindsay [address]  [address] [facility] [address], [address] [facility]  Authorization for Surgical Operation and Procedure     Date:___________                                                                                                         Time:__________ some, but not all, of the potential risks that can occur: fever and allergic reactions, hemolytic reactions, transmission of diseases such as Hepatitis, AIDS and Cytomegalovirus (CMV) and fluid overload.   In the event that I wish to have an autologous matos or my attending physician will determine when the applicable recovery period ends for purposes of reinstating the DNAR order.   10. Patients having a sterilization procedure: I understand that if the procedure is successful the results will be permanent and to: a. Allow the anesthesiologist (anesthesia doctor) to give me medicine and do additional procedures as necessary.  Some examples are: Starting or using an “IV” to give me medicine, fluids or blood during my procedure, and having a breathing tube placed (“spinal”, “epidural”, & “nerve blocks”): I understand that rare but potential complications include headache, bleeding, infection, seizure, irregular heart rhythms, and nerve injury.     I can change my mind about having anesthesia services at any time be